# Patient Record
Sex: MALE | Race: WHITE | NOT HISPANIC OR LATINO | Employment: STUDENT | URBAN - METROPOLITAN AREA
[De-identification: names, ages, dates, MRNs, and addresses within clinical notes are randomized per-mention and may not be internally consistent; named-entity substitution may affect disease eponyms.]

---

## 2023-10-27 PROBLEM — D22.30 MELANOCYTIC NEVI OF UNSPECIFIED PART OF FACE: Status: ACTIVE | Noted: 2021-03-19

## 2023-10-27 PROBLEM — F90.9 ADHD (ATTENTION DEFICIT HYPERACTIVITY DISORDER): Status: ACTIVE | Noted: 2023-10-27

## 2023-10-27 PROBLEM — R51.9 HEADACHE: Status: ACTIVE | Noted: 2023-10-27

## 2023-10-27 PROBLEM — D69.6 THROMBOCYTOPENIA (CMS-HCC): Status: ACTIVE | Noted: 2023-10-27

## 2023-10-27 PROBLEM — D69.6 LOW PLATELET COUNT (CMS-HCC): Status: ACTIVE | Noted: 2023-10-27

## 2023-10-27 PROBLEM — R42 LIGHTHEADEDNESS: Status: ACTIVE | Noted: 2023-10-27

## 2023-10-27 PROBLEM — F95.2 TOURETTES SYNDROME: Status: ACTIVE | Noted: 2023-10-27

## 2023-10-27 PROBLEM — F33.2 SEVERE EPISODE OF RECURRENT MAJOR DEPRESSIVE DISORDER, WITHOUT PSYCHOTIC FEATURES (MULTI): Status: ACTIVE | Noted: 2023-10-27

## 2023-10-27 PROBLEM — T50.905A ADVERSE REACTION TO DRUG: Status: ACTIVE | Noted: 2023-10-27

## 2023-10-27 PROBLEM — R10.9 ABDOMINAL PAIN: Status: ACTIVE | Noted: 2023-10-27

## 2023-10-27 PROBLEM — R19.7 DIARRHEA: Status: ACTIVE | Noted: 2023-10-27

## 2023-10-27 PROBLEM — S06.0X0A CONCUSSION WITH NO LOSS OF CONSCIOUSNESS: Status: ACTIVE | Noted: 2023-10-27

## 2023-10-27 PROBLEM — S39.012A BACK STRAIN: Status: ACTIVE | Noted: 2023-10-27

## 2023-10-27 PROBLEM — Z86.59 HISTORY OF TICS: Status: ACTIVE | Noted: 2023-10-27

## 2023-10-27 PROBLEM — R53.83 FATIGUE: Status: ACTIVE | Noted: 2023-10-27

## 2023-10-27 PROBLEM — L25.9 CONTACT DERMATITIS: Status: ACTIVE | Noted: 2023-10-27

## 2023-10-27 PROBLEM — G47.00 INSOMNIA: Status: ACTIVE | Noted: 2023-10-27

## 2023-10-27 PROBLEM — D22.5 MELANOCYTIC NEVI OF TRUNK: Status: ACTIVE | Noted: 2021-03-19

## 2023-10-27 PROBLEM — E80.4 GILBERT'S SYNDROME: Status: ACTIVE | Noted: 2023-10-27

## 2023-10-27 PROBLEM — R63.6 LOW WEIGHT: Status: ACTIVE | Noted: 2023-10-27

## 2023-10-27 PROBLEM — L90.5 SCAR CONDITION AND FIBROSIS OF SKIN: Status: ACTIVE | Noted: 2021-03-19

## 2023-10-27 PROBLEM — R42 DIZZINESS: Status: ACTIVE | Noted: 2023-10-27

## 2023-10-27 PROBLEM — L81.4 OTHER MELANIN HYPERPIGMENTATION: Status: ACTIVE | Noted: 2021-03-19

## 2023-10-27 PROBLEM — G56.20 LESION OF ULNAR NERVE: Status: ACTIVE | Noted: 2023-10-27

## 2023-10-27 PROBLEM — F50.9 EATING DISORDER: Status: ACTIVE | Noted: 2023-10-27

## 2023-10-27 PROBLEM — E80.6 HYPERBILIRUBINEMIA: Status: ACTIVE | Noted: 2023-10-27

## 2023-10-27 RX ORDER — LORATADINE 10 MG/1
1 TABLET ORAL DAILY
COMMUNITY
Start: 2017-07-08

## 2023-10-27 RX ORDER — OMEPRAZOLE 20 MG/1
20 CAPSULE, DELAYED RELEASE ORAL DAILY
COMMUNITY
Start: 2023-02-10

## 2023-10-27 RX ORDER — IBUPROFEN 600 MG/1
600 TABLET ORAL 3 TIMES DAILY PRN
COMMUNITY
Start: 2021-02-03

## 2023-10-27 RX ORDER — TRIAMCINOLONE ACETONIDE 1 MG/G
CREAM TOPICAL 2 TIMES DAILY
COMMUNITY
Start: 2016-09-28

## 2023-10-27 RX ORDER — ONDANSETRON 4 MG/1
4 TABLET, ORALLY DISINTEGRATING ORAL EVERY 8 HOURS
COMMUNITY
Start: 2021-02-02

## 2023-10-30 ENCOUNTER — EVALUATION (OUTPATIENT)
Dept: OCCUPATIONAL THERAPY | Facility: CLINIC | Age: 18
End: 2023-10-30
Payer: COMMERCIAL

## 2023-10-30 DIAGNOSIS — M25.522 LEFT ELBOW PAIN: Primary | ICD-10-CM

## 2023-10-30 DIAGNOSIS — G56.22 LESION OF ULNAR NERVE, LEFT UPPER LIMB: ICD-10-CM

## 2023-10-30 PROCEDURE — 97165 OT EVAL LOW COMPLEX 30 MIN: CPT | Mod: GO | Performed by: OCCUPATIONAL THERAPIST

## 2023-10-30 NOTE — LETTER
November 1, 2023     Patient: Garrison Vallecillo   YOB: 2005   Date of Visit: 10/30/2023       To Whom it May Concern:    Garrison Vallecillo was seen in my clinic on 10/30/2023. He {Return to school/sport:42997}.    If you have any questions or concerns, please don't hesitate to call.         Sincerely,          Dixie Lopez, OT        CC: No Recipients

## 2023-10-30 NOTE — PROGRESS NOTES
Evaluation/Treatment    Patient Name: Garrison Vallecillo  MRN: [id number]  : 2005  Today's Date: 23    OT Evaluation (Low) Time Entry: 40  Time Calculation  Start Time: 0850  Stop Time: 0930  Time Calculation (min): 40 min      Subjective   Current Problem/Diagnosis:  1. Left elbow pain        2. 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 Patient underwent L Cubital Tunnel Release with Ulnar Nerve Transposition on 10/16/23.    Quality of life: good    Pain  Current pain ratin  At best pain ratin  At worst pain rating: 10  Location: L elbow  Quality: sharp  Relieving factors: change in position  Aggravating factors: movement  Progression: improved    Social Support  Lives in: multiple-level home  Lives with: Grandparents.    Hand dominance: right    Patient Goals  Patient goals for therapy: decreased edema, decreased pain, increased motion, increased strength, independence with ADLs/IADLs, return to sport/leisure activities and return to work         Precautions: 5# restriction        Objective     Prior Functional Status: Fully Independent     Work History: Prior to surgery working at [employer] 5 days/week x 13 hrs/day     Occupation and Activities:  Work status: off work  Job title/type of work:  Shipment; loading/unloading    Current functional limitations: Bathing, Dressing, Lifting, Holding, Crafts/hobbies, and Driving       Objective     Sensation: Denies abnormal sensation in LUE prior to sx; however, patient reports has returned to normal per patient; however, hypersensitivity noted throughout surgical site.     Appearance: 7 cm incision with  Steri-strips intact on arrival; ecchymosis throughout proximal FA.    Edema: Moderate edema in L elbow and proximal FA    Coordination: Impaired      Range of Motion/Strength:    MMT deferred due to pain     Upper Extremity ROM    AROM  Right  Left   ELBOW Flexion/Extension 150/0 130/-45     FOREARM Pronation 90 90    Supination 90 90     WRIST Flexion WFL WFL    Extension WFL WFL    Radial Deviation WFL WFL    Ulnar Deviation WFL WFL     Hand Range of Motion     All digits WFL for flexion/extension and abduction/adduction.      Hand Strength   (lbs) Right Left   1     2 N/a N/a   3     4     5       Pinch Right Left   2-pt N/a N/a   3-pt N/a N/a   Lateral N/a N/a       Outcome Measure: UEFI=76/80    Splinting: N/a    Modalities: Therapist educated patient on use of hot/cold modalities for pain management and pre/post-HEP.    Therapy/Activity: Therapist provided demonstration with verbal instruction for the following AROM ther ex:  Gross digital flexion/extension  Digital abduction/adduction  Opposition  Wrist flexion/extension  Wrist RD/UD  FA supination/pronation  Elbow flexion/extension  10 reps each  Written handout issued; HEP established       EDUCATION: Therapist educated patient on edema management strategies; issued patient Size E compression sleeve with replacements to be worn for edema control. Therapist educated patient on surgical precautions and functional implications. Educated patient on incision care--therapist thoroughly cleaned surgical site and removed Steri-strips at this time. Therapist educated patient on desensitization strategies/techniques to reduce hypersensitivity at surgical site. Therapist educated patient on importance of HEP and follow through to achieve maximum benefits of intervention.        Assessment & Plan     Assessment  Impairments: abnormal coordination, abnormal or restricted ROM, activity intolerance, impaired physical strength, lacks appropriate home exercise program  and pain with function  Assessment details: Patient is a 17-year-old male who presents with L elbow pain, edema, impaired ROM, weakness, and impaired functional use of LUE following surgical intervention. Skilled OT intervention indicated to address deficits and facilitate return to PLOF.    Low complexity evaluation selected due to patient's clinical presentation, medical stability, and condition uncomplicated by existing co-morbidities that may affect patient's rehab tolerance, progression, and potential.   Prognosis: good    Plan  Planned therapy interventions: home exercise program, functional ROM exercises, flexibility, compression, ADL retraining, manual therapy, neuromuscular re-education, motor coordination training, soft tissue mobilization, therapeutic activities, stretching and strengthening  Frequency: 1x week  Duration in visits: 8  Treatment plan discussed with: patient           Goals:  Active       OT Goals       1. Patient will increase L elbow flexion by at least 15 degrees to increase functional use of LUE.       Start:  10/31/23    Expected End:  11/13/23            2. Patient will increase L elbow extension to -5 to increase functional use of LUE.       Start:  10/31/23    Expected End:  11/13/23            3. Patient will increase overall functional independence AEB UEFI score of 80/80.       Start:  10/31/23    Expected End:  12/25/23            4. Patient will increase L hand  strength to within 65% of R to increase functional use of LUE.       Start:  10/31/23    Expected End:  12/25/23

## 2023-10-30 NOTE — LETTER
November 1, 2023     Patient: Garrison Vallecillo   YOB: 2005   Date of Visit: 10/30/2023       To Whom It May Concern:    It is my medical opinion that Garrison Vallecillo {Work release (duty restriction):59139}.    If you have any questions or concerns, please don't hesitate to call.         Sincerely,        Dixie Lopez, OT    CC: No Recipients

## 2023-10-31 PROBLEM — M25.522 LEFT ELBOW PAIN: Status: ACTIVE | Noted: 2023-10-31

## 2023-10-31 SDOH — ECONOMIC STABILITY: GENERAL: QUALITY OF LIFE: GOOD

## 2023-10-31 ASSESSMENT — ENCOUNTER SYMPTOMS
PAIN SCALE AT HIGHEST: 10
QUALITY: SHARP
EXACERBATED BY: MOVEMENT
PAIN SCALE: 7
PAIN SCALE AT LOWEST: 0
ALLEVIATING FACTORS: CHANGE IN POSITION
PAIN LOCATION: L ELBOW

## 2023-11-07 DIAGNOSIS — M25.522 LEFT ELBOW PAIN: Primary | ICD-10-CM

## 2023-11-15 ENCOUNTER — TREATMENT (OUTPATIENT)
Dept: OCCUPATIONAL THERAPY | Facility: CLINIC | Age: 18
End: 2023-11-15
Payer: COMMERCIAL

## 2023-11-15 DIAGNOSIS — M25.522 LEFT ELBOW PAIN: ICD-10-CM

## 2023-11-15 PROCEDURE — 97110 THERAPEUTIC EXERCISES: CPT | Mod: GO,CO

## 2023-11-15 NOTE — PROGRESS NOTES
"Occupational Therapy Treatment    Patient Name: Garrison Vallecillo  MRN: [id number]  Today's Date: 11/15/2023    Time Calculation  Start Time: 1036  Stop Time: 1120  Time Calculation (min): 44 min  OT Therapeutic Procedures Time Entry  Therapeutic Exercise Time Entry: 44    Insurance:  Visit number: 2 of 8  Authorization info: Authorized through 12/25/23  Insurance Type: [employer]    Subjective   L cubital tunnel release with ulnar nerve transposition on 10/16  Referred by: Mayur, follow up first week of December    Patient reports \"my arm is actually getting better\" . Reviewed OT goals and 5# precaution with pt, he verbalized understanding    Performing HEP?: Yes    Pain:   1-2/10, not using modalities. Educated in importance of use of heat and ice to achieve best outcome.     Objective    Physical Observation:trace edema noted in L elbow. Healed incision, scar hard in most areas. Writer performed and educated pt in scar massage, pt did not tolerate well. Noted hypersensitivity. Pt encouraged to follow through with de-sensitization and scar massage at home.   Sensory: numbness noted on bottom of forearm     measured at 115# on R UE and 60# on L UE  AROM measured at 140 degrees L elbow flexion(inc by 10) and -28 L elbow ext (inc 13)  Treatment:    Modalities:   Moist heat applied at start pf session to L elbow x 5 min during goal review to promote muscle movement during session.   Ice applied at end of session x 5 min to reduce edema and inflammation    Therapeutic Exercise:  PROM by writer of L elbow flexion and ext  AROM by pt of L elbow flexion and ext , FA supinaiton and pronation with focus on holding 5 sec at end range x 10 reps   Educated pt in HEP with yellow (low resistance) theraputty, pt completd 5 reps of gross grasp, individual pinch. Handout provided.   Pt completed 5 reps of Nerve glides with L elbow, handout provided for HEP follow through.     Post-tx pain:3/10    Assessment/Plan Pt has " increased AROM of L elbow in fleixon, ext and supination. Max fatigue by end of session. Pt to follow through with exisiting and added HEP along with use of modalities. Pt tolerated ROM well, needs to follow through with scar and de-sensitization.       OT Goals         1. Patient will increase L elbow flexion by at least 15 degrees to increase functional use of LUE.     PROGRESSING    Start:  10/31/23    Expected End:  11/13/23              2. Patient will increase L elbow extension to -5 to increase functional use of LUE.     PROGRESSING    Start:  10/31/23    Expected End:  11/13/23              3. Patient will increase overall functional independence AEB UEFI score of 80/80.     PROGRESSING    Start:  10/31/23    Expected End:  12/25/23              4. Patient will increase L hand  strength to within 65% of R to increase functional use of LUE.     PROGRESSING    Start:  10/31/23    Expected End:  12/25/23

## 2023-11-16 ENCOUNTER — TELEPHONE (OUTPATIENT)
Dept: PRIMARY CARE | Facility: CLINIC | Age: 18
End: 2023-11-16
Payer: COMMERCIAL

## 2023-11-16 DIAGNOSIS — R39.9 UTI SYMPTOMS: Primary | ICD-10-CM

## 2023-11-16 NOTE — TELEPHONE ENCOUNTER
spoke with patient this morning. He is having urinary frequency and strong odor. He does have some burning and his urine is red in color. Dr Mercado is not here to day. Advised to go to the urgent care to be seen today. Patient declined, stating he will wait until Dr Mercado can get him in.

## 2023-11-22 ENCOUNTER — APPOINTMENT (OUTPATIENT)
Dept: OCCUPATIONAL THERAPY | Facility: CLINIC | Age: 18
End: 2023-11-22
Payer: COMMERCIAL

## 2023-11-22 NOTE — PROGRESS NOTES
Occupational Therapy                 Therapy Communication Note    Patient Name: Garrison Vallecillo  MRN: [id number]  Today's Date: 11/22/2023     Discipline: Occupational Therapy    Missed Visit Reason:  Pt's mom called, pt is not feeling well.     Missed Time: Cancel

## 2023-11-29 ENCOUNTER — TREATMENT (OUTPATIENT)
Dept: OCCUPATIONAL THERAPY | Facility: CLINIC | Age: 18
End: 2023-11-29
Payer: COMMERCIAL

## 2023-11-29 DIAGNOSIS — M25.522 LEFT ELBOW PAIN: ICD-10-CM

## 2023-11-29 PROCEDURE — 97110 THERAPEUTIC EXERCISES: CPT | Mod: GO,CO

## 2023-11-29 NOTE — PROGRESS NOTES
"Occupational Therapy Treatment    Patient Name: Garrison Vallecillo  MRN: [id number]  Today's Date: 11/29/2023    Time Calculation  Start Time: 1035  Stop Time: 1119  Time Calculation (min): 44 min  OT Therapeutic Procedures Time Entry  Therapeutic Exercise Time Entry: 44  Insurance:  Visit number: 3 of 8  Authorization info: Authorized through 12/25/23  Insurance Type: [employer]    Subjective   L cubital tunnel release with ulnar nerve transposition on 10/16  Referred by: Mayur, follow up first week of December    Patient reports \"my arm is actually getting better, but sometimes it feels weird\" .   Reviewed OT goals and 5# precaution with pt, he verbalized understanding. Pt stated he is using heat 2 times daily.     Performing HEP?: Yes    Pain:   1/10' tight\".   Objective    Physical Observation:trace edema noted in L elbow. Healed incision, scar hard in most areas. Noted two areas, along top and bottom of scar where internal stitches are possibly coming through. No drainage or redness. OTR assessed. Pt advised to leave along and consult with doctor on follow up. Writer performed and educated pt in scar massage, pt did not tolerate well. Noted hypersensitivity. Pt highly encouraged to follow through with de-sensitization and scar massage at home.   Sensory: hypersensitivity noted along elbow and scar area.     AROM measured at 140 degrees L elbow flexion(inc by 10) and -28 L elbow ext (inc 13)  Treatment:    Modalities:   Ice applied at end of session x 5 min to reduce edema and inflammation    Therapeutic Exercise:  Seated arm bike at no resistance for 3 min forward and 3 min reverse. Good tolerance with occasional cues to not compensate  Initiated pulleys for  flexion/ext of elbow and shoulder.  15 reps with 5 sec hold at end range.   Initiated 1-2# resistance PRE's , completed 1-2 sets of 10 reps each for bcep curls with palm up, palm down and palm neutral. Provided with written HEP.     AROM measured on this " date as follows:   140 elbow flexion(increased 10)  -5 elbow extension(Better by 23 degrees)      Post-tx pain:3/10    Assessment/Plan Pt has increased AROM of L elbow in flexion, ext and supination. Min /mod  fatigue (down from max ) by end of session. Pt to follow through with exisiting and added HEP along with use of modalities. Pt tolerated ROM well, needs to follow through with scar massage, wear of nu gel and de-sensitization.       OT Goals         1. Patient will increase L elbow flexion by at least 15 degrees to increase functional use of LUE.     PROGRESSING    Start:  10/31/23    Expected End:  11/13/23              2. Patient will increase L elbow extension to -5 to increase functional use of LUE.     PROGRESSING    Start:  10/31/23    Expected End:  11/13/23              3. Patient will increase overall functional independence AEB UEFI score of 80/80.     PROGRESSING    Start:  10/31/23    Expected End:  12/25/23              4. Patient will increase L hand  strength to within 65% of R to increase functional use of LUE.     PROGRESSING    Start:  10/31/23    Expected End:  12/25/23

## 2023-12-06 ENCOUNTER — DOCUMENTATION (OUTPATIENT)
Dept: OCCUPATIONAL THERAPY | Facility: CLINIC | Age: 18
End: 2023-12-06
Payer: COMMERCIAL

## 2023-12-06 NOTE — PROGRESS NOTES
Occupational Therapy                 Therapy Communication Note    Patient Name: Garrison Vallecillo  MRN: [id number]  Today's Date: 12/6/2023     Discipline: Occupational Therapy    Missed Visit Reason:  Patient failed to call/show for scheduled appointment.    Missed Time: No Show    Comment:

## 2023-12-11 ENCOUNTER — CLINICAL SUPPORT (OUTPATIENT)
Dept: OCCUPATIONAL THERAPY | Facility: CLINIC | Age: 18
End: 2023-12-11
Payer: COMMERCIAL

## 2023-12-11 DIAGNOSIS — M25.522 LEFT ELBOW PAIN: ICD-10-CM

## 2023-12-11 PROCEDURE — 97110 THERAPEUTIC EXERCISES: CPT | Mod: GO,CO

## 2023-12-11 ASSESSMENT — PAIN SCALES - GENERAL: PAINLEVEL_OUTOF10: 1

## 2023-12-11 ASSESSMENT — PAIN DESCRIPTION - DESCRIPTORS: DESCRIPTORS: TIGHTNESS

## 2023-12-11 ASSESSMENT — PAIN - FUNCTIONAL ASSESSMENT: PAIN_FUNCTIONAL_ASSESSMENT: 0-10

## 2023-12-11 NOTE — PROGRESS NOTES
"Time Calculation  Start Time: 0848  Stop Time: 0938  Time Calculation (min): 50 min  OT Therapeutic Procedures Time Entry  Therapeutic Exercise Time Entry: 50Occupational Therapy Treatment    Patient Name: Garrison Vallecillo  MRN: [id number]  Today's Date: 12/11/2023    Time Calculation  Start Time: 0848  Stop Time: 0938  Time Calculation (min): 50 min  OT Therapeutic Procedures Time Entry  Therapeutic Exercise Time Entry: 50  Insurance:  Visit number: 4 of 8  Authorization info: Authorized through 12/25/23  Insurance Type: [employer]    Subjective   L cubital tunnel release with ulnar nerve transposition on 10/16  Referred by: Mayur, had follow up last week, doctor cleared pt for lifting within pt tolerance. Next follow up mid January.     Patient reports \" My arm just doesn't feel normal when I move it. I am kind of hesitant to bend it because of that. I did try to lift something heavy and my arm just got tired real quick\". Writer discussed healing process and importance of functional activity , HEP and ROM to prevent deficits. Pt using heat up to twice daily.   Reviewed goals with pt, he verbalized understanding.   Performing HEP?: Yes    Pain:  Pain Assessment  Pain Assessment: 0-10  Pain Score: 1  Pain Type: Acute pain, Surgical pain  Pain Location: Elbow  Pain Orientation: Left  Pain Descriptors: Tightness1/10' tight\".   Objective    Physical Observation:trace edema noted in L elbow. Healed incision, scar hard only  at proximal end. Hypersensitivity noted there. Writer performed and educated pt in scar massage, pt did not tolerate well. Noted hypersensitivity. Pt completing scar massage a couple times a day and using heat 2 times a day. Noted numbness in base of forearm near elbow. Educated in continued re-sensitization.   UEFI completed by pt at 65/80 (was 76/80 at eval).  AROM measured at 145 degrees L elbow flexion(inc by 15) and -3 L elbow ext (inc 25)  Grasp measured at 115# R UE(same) and and 115# (inc " by 55#)  Treatment:    Modalities:   Moist heat applied to L elbow , bicep and forearm x 5 min to promote muscle movement during session.   Ice applied at end of session to reduce edema and inflammation x 5 min  Therapeutic Exercise:  Standing  arm bike at level 2.5(up from 1.0) resistance for 3 min forward and 3 min reverse. Initiated L arm only at level 1 for 1 min forward and 1 min in reverse.  Good tolerance with decreased cues from last session to not compensate  Provided pt with orange resistance(low/med)theraputty for upgrade to existing HEP  Initiated 3# (up from 2#)  resistance PRE's , completed 1 sets of 10 reps each for bcep curls with palm up, palm down and palm neutral. Provided with written HEP.     Post-tx pain:0/10    Assessment/Plan Pt has increased AROM of L elbow in flexion, ext and supination. Noted increase in grasp of L UE.  Min  fatigue (down from min/mod ) by end of session. Pt to follow through with 3# PRE's, along with use of modalities. Pt to follow through with scar massage, wear of nu gel and de-sensitization. Noted decrease in UEFI, pt stated that he didn't quite understand the questions the first time he completed it. Pt motivated to return to function.   Education  Individual(s) Educated: Patient  Education Provided: POC discussed and agreed upon  Home Program: Strengthening, AROM  Equipment:  (3# free weights)  Patient/Caregiver Demonstrated Understanding: yes  Plan of Care Discussed and Agreed Upon: yes  Patient Response to Education: Patient/Caregiver Verbalized Understanding of Information, Patient/Caregiver Performed Return Demonstration of Exercises/Activities, Patient/Caregiver Asked Appropriate Questions   OT Goals         1. Patient will increase L elbow flexion by at least 15 degrees to increase functional use of LUE.     MET    Start:  10/31/23    Expected End:  11/13/23              2. Patient will increase L elbow extension to -5 to increase functional use of LUE.      MET    Start:  10/31/23    Expected End:  11/13/23              3. Patient will increase overall functional independence AEB UEFI score of 80/80.     PROGRESSING    Start:  10/31/23    Expected End:  12/25/23              4. Patient will increase L hand  strength to within 65% of R to increase functional use of LUE.     MET    Start:  10/31/23    Expected End:  12/25/23

## 2023-12-13 ENCOUNTER — APPOINTMENT (OUTPATIENT)
Dept: OCCUPATIONAL THERAPY | Facility: CLINIC | Age: 18
End: 2023-12-13
Payer: COMMERCIAL

## 2023-12-13 NOTE — PROGRESS NOTES
Occupational Therapy                 Therapy Communication Note    Patient Name: Garrison Vallecillo  MRN: [id number]  Today's Date: 12/13/2023     Discipline: Occupational Therapy    Missed Visit Reason:  Pt cancelled through My chart arg[facility]    Missed Time: Cancel

## 2023-12-20 ENCOUNTER — TREATMENT (OUTPATIENT)
Dept: OCCUPATIONAL THERAPY | Facility: CLINIC | Age: 18
End: 2023-12-20
Payer: COMMERCIAL

## 2023-12-20 DIAGNOSIS — M25.522 LEFT ELBOW PAIN: ICD-10-CM

## 2023-12-20 PROCEDURE — 97110 THERAPEUTIC EXERCISES: CPT | Mod: GO,CO

## 2023-12-20 ASSESSMENT — PAIN - FUNCTIONAL ASSESSMENT: PAIN_FUNCTIONAL_ASSESSMENT: 0-10

## 2023-12-20 ASSESSMENT — PAIN DESCRIPTION - DESCRIPTORS: DESCRIPTORS: SORE;TIGHTNESS

## 2023-12-20 ASSESSMENT — PAIN SCALES - GENERAL: PAINLEVEL_OUTOF10: 1

## 2023-12-20 NOTE — PROGRESS NOTES
"Occupational Therapy treatment    Time Calculation  Start Time: 0938  Stop Time: 1018  Time Calculation (min): 40 min  OT Therapeutic Procedures Time Entry  Therapeutic Exercise Time Entry: 40    Patient Name: Garrison Vallecillo  MRN: [id number]  Today's Date: 12/20/2023    Time Calculation  Start Time: 0938  Stop Time: 1018  Time Calculation (min): 40 min  OT Therapeutic Procedures Time Entry  Therapeutic Exercise Time Entry: 40  Insurance:  Visit number: 5 of 8  Authorization info: Authorized through 12/25/23  Insurance Type: [employer]    Subjective   L cubital tunnel release with ulnar nerve transposition on 10/16  Referred by: Mayur, had follow up last week, doctor cleared pt for lifting within pt tolerance. Next follow up mid January.     Patient reports \" My arm just doesn't feel normal when I move it. I am kind of hesitant to bend it because of that. I did try to lift something heavy and my arm just got tired real quick\". Writer discussed healing process and importance of functional activity , HEP and ROM to prevent deficits. Pt using heat up to twice daily.   Reviewed goals with pt, he verbalized understanding.   Performing HEP?: Yes    Pain:  Pain Assessment  Pain Assessment: 0-10  Pain Score: 1  Pain Type: Acute pain, Surgical pain  Pain Location: Elbow  Pain Orientation: Left  Pain Descriptors: Sore, Tightness1/10' I can tell it is tight\". States it is only with exercise. Using heat 2 times a day.   Objective    Physical Observation:trace edema noted in L elbow. Healed incision, scar softening.  Hypersensitivity better. Pt is competing scar massage and using heat 2 times a day. Noted numbness in base of forearm near elbow.   AROM measured, unchanged since last measure.     Modalities:   Moist heat applied to L elbow , bicep and forearm x 5 min to promote muscle movement during session.   Ice applied at end of session to reduce edema and inflammation x 5 min    Therapeutic Exercise:  Standing  arm bike at " "level 4.0(up from 3.0) resistance for 3 min forward and 3 min reverse. L arm only at level 2(up from 1)  for 1 min forward and 1 min in reverse.  Good tolerance   Initiated  theraband HEP , 1 set of 10 reps for biceps with palm up and palm down and neutral , also tricep extension with orange (low/medium resistance ) theraband. Provided with written HEP for follow through.     Post-tx pain:1/10, \"a little sore\".     Assessment:  No fatigue . Pt to follow through with added theraband HEP, 3# PRE's increasing reps, along with use of modalities. Pt to follow through with scar massage, wear of nu gel and de-sensitization.  Pt motivated to return to function.   Education  Individual(s) Educated: Patient  Education Provided: POC discussed and agreed upon  Home Program: Strengthening, AROM  Equipment:  (orange theraband)  Patient/Caregiver Demonstrated Understanding: yes  Plan of Care Discussed and Agreed Upon: yes  Patient Response to Education: Patient/Caregiver Verbalized Understanding of Information, Patient/Caregiver Performed Return Demonstration of Exercises/Activities, Patient/Caregiver Asked Appropriate Questions   OT Goals         1. Patient will increase L elbow flexion by at least 15 degrees to increase functional use of LUE.     MET    Start:  10/31/23    Expected End:  11/13/23              2. Patient will increase L elbow extension to -5 to increase functional use of LUE.     MET    Start:  10/31/23    Expected End:  11/13/23              3. Patient will increase overall functional independence AEB UEFI score of 80/80.     PROGRESSING    Start:  10/31/23    Expected End:  12/25/23              4. Patient will increase L hand  strength to within 65% of R to increase functional use of LUE.     MET    Start:  10/31/23    Expected End:  12/25/23           "

## 2024-02-23 ENCOUNTER — DOCUMENTATION (OUTPATIENT)
Dept: OCCUPATIONAL THERAPY | Facility: CLINIC | Age: 19
End: 2024-02-23
Payer: COMMERCIAL

## 2024-02-23 NOTE — PROGRESS NOTES
Occupational Therapy Discharge [facility]    Patient Name: Garrison Vallecillo  MRN: [id number]  Today's Date: 2/23/2024    Subjective   Current Problem:  L cubital tunnel release with ulnar nerve transposition on 10/16/23.    Pain: N/a       Objective   Interventions: Ther ex, ther act, manual, neuromuscular re-education, ADL retraining.     ADL Assessment:  UEFI at evalation= 76/80  UEFI as of 12/20/23= 65/80     Extremity Assessments:    AROM:  L elbow flexion: 145 (+15) and -3   L elbow extension: -3 (inc 25)       (lbs) Right Left   1     2 115# 115# (+55)   3     4     5            Assessment/Plan Patient inconsistently attended OT tx sx's throughout POC; progress seen in AROM and  strength at time of last tx sx.          Goals:  Active       OT Goals       1. Patient will increase L elbow flexion by at least 15 degrees to increase functional use of LUE. (Met)       Start:  10/31/23    Expected End:  11/13/23    Resolved:  11/29/23         2. Patient will increase L elbow extension to -5 to increase functional use of LUE. (Met)       Start:  10/31/23    Expected End:  11/13/23    Resolved:  11/29/23         3. Patient will increase overall functional independence AEB UEFI score of 80/80. (Progressing)       Start:  10/31/23    Expected End:  12/25/23            4. Patient will increase L hand  strength to within 65% of R to increase functional use of LUE. (Progressing)       Start:  10/31/23    Expected End:  12/25/23

## 2024-08-09 ENCOUNTER — HOSPITAL ENCOUNTER (EMERGENCY)
Facility: HOSPITAL | Age: 19
Discharge: HOME | End: 2024-08-09
Payer: COMMERCIAL

## 2024-08-09 ENCOUNTER — APPOINTMENT (OUTPATIENT)
Dept: RADIOLOGY | Facility: HOSPITAL | Age: 19
End: 2024-08-09
Payer: COMMERCIAL

## 2024-08-09 VITALS
SYSTOLIC BLOOD PRESSURE: 120 MMHG | TEMPERATURE: 98.5 F | DIASTOLIC BLOOD PRESSURE: 67 MMHG | HEIGHT: 70 IN | RESPIRATION RATE: 16 BRPM | WEIGHT: 130 LBS | BODY MASS INDEX: 18.61 KG/M2 | HEART RATE: 65 BPM | OXYGEN SATURATION: 99 %

## 2024-08-09 DIAGNOSIS — S89.91XA INJURY OF RIGHT KNEE, INITIAL ENCOUNTER: Primary | ICD-10-CM

## 2024-08-09 PROCEDURE — 73564 X-RAY EXAM KNEE 4 OR MORE: CPT | Mod: RIGHT SIDE | Performed by: RADIOLOGY

## 2024-08-09 PROCEDURE — 99283 EMERGENCY DEPT VISIT LOW MDM: CPT

## 2024-08-09 PROCEDURE — 73564 X-RAY EXAM KNEE 4 OR MORE: CPT | Mod: RT

## 2024-08-09 ASSESSMENT — PAIN - FUNCTIONAL ASSESSMENT: PAIN_FUNCTIONAL_ASSESSMENT: 0-10

## 2024-08-09 ASSESSMENT — COLUMBIA-SUICIDE SEVERITY RATING SCALE - C-SSRS
1. IN THE PAST MONTH, HAVE YOU WISHED YOU WERE DEAD OR WISHED YOU COULD GO TO SLEEP AND NOT WAKE UP?: NO
2. HAVE YOU ACTUALLY HAD ANY THOUGHTS OF KILLING YOURSELF?: NO
6. HAVE YOU EVER DONE ANYTHING, STARTED TO DO ANYTHING, OR PREPARED TO DO ANYTHING TO END YOUR LIFE?: NO

## 2024-08-09 ASSESSMENT — PAIN DESCRIPTION - LOCATION: LOCATION: KNEE

## 2024-08-09 ASSESSMENT — PAIN SCALES - GENERAL: PAINLEVEL_OUTOF10: 1

## 2024-08-10 NOTE — ED PROVIDER NOTES
HPI   Chief Complaint   Patient presents with    Knee Pain     Sitting on the bed obed crossed and heard something pop       Patient is a 18-year-old male presenting to the ED with cc of right knee pain times today.  Patient was sitting in his chair and crosses leg when he heard and felt a pop.  Patient states he is unable to bear weight since.  Patient denies any other injuries to the area denies any history of dislocations in the past.  Denies any numbness or tingling.  Patient states his whole leg hurts.  Patient denies any other symptoms.  Patient states he feels disconnected and is trying to tell his brain to strengthen leg but cannot.  Patient had marijuana prior to calling ambulance.              Patient History   Past Medical History:   Diagnosis Date    Allergic contact dermatitis due to plants, except food 09/28/2016    Contact dermatitis due to poison ivy    Anxiety disorder, unspecified 01/05/2015    Anxiety    Contact with and (suspected) exposure to other bacterial communicable diseases 04/22/2018    Exposure to strep throat    Contact with and (suspected) exposure to other viral communicable diseases 04/22/2018    Exposure to influenza    Nasal congestion 11/23/2020    Nasal congestion with rhinorrhea    Other conditions influencing health status 11/23/2020    History of cough    Pain in thoracic spine 03/22/2017    Acute left-sided thoracic back pain     Past Surgical History:   Procedure Laterality Date    OTHER SURGICAL HISTORY  01/05/2015    Surgery Spermatic Cord Excision Of Hydrocele     Family History   Problem Relation Name Age of Onset    Anxiety disorder Mother      Depression Mother      Migraines Father       Social History     Tobacco Use    Smoking status: Never    Smokeless tobacco: Never   Substance Use Topics    Alcohol use: Not on file    Drug use: Yes     Types: Marijuana       Physical Exam   ED Triage Vitals [08/09/24 1832]   Temperature Heart Rate Respirations BP   36.9 °C (98.5  °F) 83 16 125/73      Pulse Ox Temp Source Heart Rate Source Patient Position   98 % Oral Monitor Sitting      BP Location FiO2 (%)     Right arm --       Physical Exam  HENT:      Head: Normocephalic.   Cardiovascular:      Pulses: Normal pulses.   Pulmonary:      Effort: Pulmonary effort is normal.   Musculoskeletal:         General: Tenderness (Pain on palpation to the right knee.) present. No deformity.      Comments: Unable to perform flexion and extension of the right knee   Neurological:      General: No focal deficit present.      Mental Status: He is alert. Mental status is at baseline.           ED Course & MDM   Diagnoses as of 08/09/24 2039   Injury of right knee, initial encounter                 No data recorded                                 Medical Decision Making  Medical Decision Making:  Patient presented as described in HPI. Patient case including ROS, PE, and treatment and plan discussed with ED attending if attached as cosigner. Due to patients presentation orders completed include as documented.  Patient presents to the ED with cc of right knee pain.  Patient states he was crossing his leg and felt a pop unable to bear weight afterwards.  Patient denies any history of dislocations in the past.  Patient is nontoxic-appearing, pain on palpation to the right knee and pain at attempt of extension or flexion.  Patient has good pulses good capillary refill.  Patient is denying any pain medication here.  X-ray is negative.  On reassessment patient endorses improvement in symptoms and able to straighten and flex the leg.  Patient is able to bear weight but does have pain still with bearing weight.  When patient first attempted to bear weight he had some instability of the right knee and not sure if this is a ligamentous injury.  Patient will be placed in knee immobilizer and given crutches educated on close follow-up with Ortho.  Given referral here.  Educated on any worsening symptoms to return  educated on close follow-up.  Educated on RICE therapy.  Patient was advised to follow up with PCP or recommended provider in 2-3 days for another evaluation and exam. I advised patient/guardian to return or go to closest emergency room immediately if symptoms change, get worse, new symptoms develop prior to follow up. If there is no improvement in symptoms in the next 24 hours they are advised to return for further evaluation and exam. I also explained the plan and treatment course. Patient/guardian is in agreement with plan, treatment course, and follow up and states verbally that they will comply.      Patient care discussed with: N/A  Social Determinants affecting care: N/A    Final diagnosis and disposition as below.  See CI    Homegoing. I discussed the differential; results and discharge plan with the patient and/or family/friend/caregiver if present.  I emphasized the importance of follow-up with the physician I referred them to in the timeframe recommended.  I explained reasons for the patient to return to the Emergency Department. They agreed that if they feel their condition is worsening or if they have any other concern they should call 911 immediately for further assistance. I gave the patient an opportunity to ask all questions they had and answered all of them accordingly. They understand return precautions and discharge instructions. The patient and/or family/friend/caregiver expressed understanding verbally and that they would comply.       Disposition:  Discharge      This note has been transcribed using voice recognition and may contain grammatical errors, misplaced words, incorrect words, incorrect phrases or other errors.        XR knee right 4+ views   Final Result   1. Unremarkable radiographic evaluation of the right knee.        MACRO:   None        Signed by: Perla Daily 8/9/2024 7:18 PM   Dictation workstation:   [id number]           Procedure  Procedures     Michelle Jara  HERB  08/09/24 2045

## 2024-08-15 ENCOUNTER — OFFICE VISIT (OUTPATIENT)
Dept: ORTHOPEDIC SURGERY | Facility: CLINIC | Age: 19
End: 2024-08-15
Payer: COMMERCIAL

## 2024-08-15 DIAGNOSIS — M76.51 PATELLAR TENDINITIS OF RIGHT KNEE: ICD-10-CM

## 2024-08-15 DIAGNOSIS — S89.91XA RIGHT KNEE INJURY, INITIAL ENCOUNTER: Primary | ICD-10-CM

## 2024-08-15 PROCEDURE — 99203 OFFICE O/P NEW LOW 30 MIN: CPT

## 2024-08-15 PROCEDURE — 99213 OFFICE O/P EST LOW 20 MIN: CPT

## 2024-08-15 PROCEDURE — 1036F TOBACCO NON-USER: CPT

## 2024-08-15 NOTE — PROGRESS NOTES
HPI  Garrison Vallecillo is a 18 y.o. male, accompanied by his mother,  in office today for   Chief Complaint   Patient presents with    Right Knee - Injury   .  he states that last week he was sitting with his knee crossed and felt his knee cap catch so he aggressively straightened out the knee and in the process he kicked the wall.  Had pain anterior knee, went to the ED, placed into immobilizer brace which he stopped wearing and given crutches which are not present.  He states the pain has improved some from last week.  It feels better in the morning, but will have pain a few hours after waking and worse as the day progresses.  No pain with flexing and extending, but pain with medial/lateral movement. He has been resting and elevating, no pain medication    Past Medical History: Gilbert's syndrome    Medication  Current Outpatient Medications on File Prior to Visit   Medication Sig Dispense Refill    ibuprofen (IBU) 600 mg tablet Take 1 tablet (600 mg) by mouth 3 times a day as needed (mild pain).      loratadine (Claritin) 10 mg tablet Take 1 tablet (10 mg) by mouth once daily.      omeprazole (PriLOSEC) 20 mg DR capsule Take 1 capsule (20 mg) by mouth once daily.      ondansetron ODT (Zofran-ODT) 4 mg disintegrating tablet Take 1 tablet (4 mg) by mouth every 8 hours.      triamcinolone (Kenalog) 0.1 % cream Apply topically twice a day.       No current facility-administered medications on file prior to visit.       Physical Exam  Constitutional: well developed, well nourished male in no acute distress  Psychiatric: normal mood, appropriate affect  Eyes: sclera anicteric  HENT: normocephalic/atraumatic  CV: regular rate and rhythm   Respiratory: non labored breathing  Integumentary: no rash  Neurological: moves all extremities    Right Knee Exam     Tenderness   The patient is experiencing tenderness in the patella and patellar tendon.    Range of Motion   The patient has normal right knee ROM.    Tests   Pasha:   Medial - negative Lateral - negative  Varus: negative Valgus: negative  Drawer:  Anterior - negative    Posterior - negative  Patellar apprehension: positive    Other   Erythema: absent  Scars: absent  Sensation: normal  Swelling: none  Effusion: no effusion present              Imaging/Lab:  X-rays were taken 8/9/24 which were reviewed by myself and read by radiology and show No acute fracture or malalignment. Joint spaces are well maintained. No significant knee joint effusion. Soft tissues are unremarkable.      Assessment  Assessment: right knee injury, right knee pain with concern for patella tendonitis    Plan  Plan:  History, physical exam, and imaging were reviewed with patient. Patient to continue with RICE.  If he has pain with ambulating, should consider using the crutches to ambulate.  Orders given for PT for strengthening and modalities.  Follow Up: as needed if pain persists over next 4-6 weeks.    All questions were answered for the patient prior to end of exam and patient addressed their understanding.    Lynsey Campbell PA-C  08/15/24

## 2024-08-26 ENCOUNTER — EVALUATION (OUTPATIENT)
Dept: PHYSICAL THERAPY | Facility: CLINIC | Age: 19
End: 2024-08-26
Payer: COMMERCIAL

## 2024-08-26 DIAGNOSIS — M76.51 PATELLAR TENDINITIS OF RIGHT KNEE: Primary | ICD-10-CM

## 2024-08-26 DIAGNOSIS — S89.91XA RIGHT KNEE INJURY, INITIAL ENCOUNTER: ICD-10-CM

## 2024-08-26 PROCEDURE — 97162 PT EVAL MOD COMPLEX 30 MIN: CPT | Mod: GP

## 2024-08-26 PROCEDURE — 97110 THERAPEUTIC EXERCISES: CPT | Mod: GP

## 2024-08-26 NOTE — PROGRESS NOTES
"Physical Therapy Examination and Treatment Note    Patient Name: Garrison Vallecillo  MRN Number: [id number]  Evaluating Clinician: EWA Colmenares PT  Today's Date: 8/26/2024  Time Calculation  Start Time: 0100  Stop Time: 0137  Time Calculation (min): 37 min    INSURANCE:  Visit Number: 1  Approved Visits: ?  Insurance Info: EVAL ONLY - [employer] / AUTH REQ / 100% COVERAGE / [employer] [id number] / ds 8/23/24.     PRECAUTIONS/SPECIAL CONCERNS/RELEVANT PMHX: Lake Syndrome, anxiety disorder, ADHD,     PT CLINICAL PROBLEM: Right knee pain Probable    Chief Medical Dx code: M76.51 (ICD-10-CM) - Patellar tendinitis of right knee S89.91XA (ICD-10-CM) - Right knee injury, initial encounter   1. Patellar tendinitis of right knee  Referral to Physical Therapy    Follow Up In Physical Therapy      2. Right knee injury, initial encounter  Referral to Physical Therapy          SUBJECTIVE: 8/9/24 August was sitting with his knee crossed and felt his knee cap catch and he then straightened his knee suddenly and felt some pain. Went to ER placed in immobilizer and provided crutches. Later consulted ortho Lynsey Campbell PAC and referred to PT. At onset states his knee was swollen but denies any bruising. Has not used crutches at all. States he also declined pain meds. He is improving and feels much better at this time and feels 90% improved his main concern is \"not having this happen again\" He states sometimes he has felt like his knee cap is going to slip out. Denies past history of patellar dislocation or traumatic knee injury.       TREATMENT:  Symptoms/NPRS before Rx: 0  Symptoms/NPRS after Rx: 0    Timed Codes: (# minutes per modality and 0 if omitted)  Ther-Ex 02725:   Manual 05891:  NMRe-ed 17657:  Gait 70471:  Stim 48215:  Traction 72586:  Self Man: [id number]:    EDU patellar hypermobility and subluxation and knee cap less stable at terminal extension and more stable in flexion > 40 degrees needs to restore knee motion and " strength and avoid sudden extension with valgus motion to manage and other options patella sleeve  QS 10x  SAQ 10x      OBJECTIVE:  STEADI Fall Risk: 0 (score of 4+ indicates fall risk)   OUTCOME TOOL: LEFS 22/80  Rom ext -12 actively and full passively  Flex 120 limited by apprehension  Patella mobility restraint good  No swelling   No deformity  Non antalgic gait  Quad set 25%  Quad 4-  Hams 4/5  Not able to SLR cannot hold knee in extension      ASSESSMENT: Most likely right patellar subluxation episode and presents today guarded and concerned that this may happen again. Lacks knee flexion by 25 degrees due to discomfort and 5-10 degrees of active knee extension. Central patella. Min crepitation. Has passive restraint to lateral glide. No swelling. Some atrophy. Needs rehab for full recovery. Improved understanding that valgus motion with knee in last 30 degrees of extension is most risky position for subluxation and more aware of knee mechanics     Patient presents with low tissue reactivity,  low condition irritability, and high subject reactivity with impairments noted below and decreased level of functional abilities and would benefit from skilled PT to address limitations and achieve goals noted below.     PLAN: PT 1/week for right patellar stability and knee program to restore extension and flexion rom quad strength knee control and function     Patient/parent/caregiver agreed and consented to plan of care for skilled physical therapy at 1 times per week for 6-12 weeks. Physical Therapy to include modalities prn as indicated, therapeutic exercise, manual therapy, neuromuscular re-education, gait and functional performance training, instruction and practice in a home exercise program (HEP) and self-management skills.     CARE PLAN/GOALS: (met, progressing, not progressing)    STG's: (      weeks /      visits )  1  2    LTG's  (      weeks /      visits )  1  2  3  4  5 The Global Rating of Change Score  (GROC) will improve to 5/7 =  “a good deal better” or more.   6 Improve functional outcome tool score (FOTS: GIL, NDI, ASES, ABC, etc.) by > 10 points for Minimally Important Clinical Difference (MICD).  7 Patient/client will be independent with a HEP and self-management skills to further enhance recovery and progress.  8 Patient/client will verbalize >75% symptom reduction for frequency and severity of symptoms and/or > two point reduction of VAS/NPRS.  9 The Patient Specific Functional Scale metric (PSFS) will improve from baseline value to greater than 80% functional.

## 2024-09-09 ENCOUNTER — APPOINTMENT (OUTPATIENT)
Dept: PHYSICAL THERAPY | Facility: CLINIC | Age: 19
End: 2024-09-09
Payer: COMMERCIAL

## 2024-09-11 ENCOUNTER — TREATMENT (OUTPATIENT)
Dept: PHYSICAL THERAPY | Facility: CLINIC | Age: 19
End: 2024-09-11
Payer: COMMERCIAL

## 2024-09-11 DIAGNOSIS — M76.51 PATELLAR TENDINITIS OF RIGHT KNEE: ICD-10-CM

## 2024-09-11 PROCEDURE — 97110 THERAPEUTIC EXERCISES: CPT | Mod: GP,CQ

## 2024-09-17 ENCOUNTER — TREATMENT (OUTPATIENT)
Dept: PHYSICAL THERAPY | Facility: CLINIC | Age: 19
End: 2024-09-17
Payer: COMMERCIAL

## 2024-09-17 DIAGNOSIS — M76.51 PATELLAR TENDINITIS OF RIGHT KNEE: ICD-10-CM

## 2024-09-17 PROCEDURE — 97110 THERAPEUTIC EXERCISES: CPT | Mod: GP

## 2024-09-17 NOTE — PROGRESS NOTES
"Physical Therapy Treatment Note    Patient Name: Garrison Vallecillo  MRN Number: [id number]  Evaluating Clinician: EWA Colmenares PT  Today's Date: 9/17/2024       INSURANCE:  Visit Number: 3  Approved Visits: 16  Insurance Info: Auth Rcvd 15 visits 8/29-12/31 CPTs [id number] EVAL ONLY - [employer] / AUTH REQ / 100% COVERAGE / [employer] [id number] / ds 8/23/24.     PRECAUTIONS/SPECIAL CONCERNS/RELEVANT PMHX: [place] Syndrome, anxiety disorder, ADHD,     PT CLINICAL PROBLEM: Right knee pain probable patellar subluxation    Chief Medical Dx code: M76.51 (ICD-10-CM) - Patellar tendinitis of right knee S89.91XA (ICD-10-CM) - Right knee injury, initial encounter     SUBJECTIVE:  8/9/24 August was sitting with his knee crossed and felt his knee cap catch and he then straightened his knee suddenly and felt some pain. Went to ER placed in immobilizer and provided crutches. Later consulted ortho Lynsey Campbell PAC and referred to PT. At onset states his knee was swollen but denies any bruising. Has not used crutches at all. States he also declined pain meds. He is improving and feels much better at this time and feels 90% improved his main concern is \"not having this happen again\" He states sometimes he has felt like his knee cap is going to slip out. Denies past history of patellar dislocation or traumatic knee injury.     9-11-24  R knee feels like its \"popping\" but it doesn't hurt    9/17/24 states his right knee has been feeling better some discomfort but was able to hike about 1 mile on a trail.    TREATMENT:  Symptoms/NPRS before Rx: 0  Symptoms/NPRS after Rx: 0  Timed Codes: (# minutes per modality and 0 if omitted)  Ther-Ex 52685:  44  Manual 34813:  NMRe-ed 20828:  Gait 32285:  Stim 94426:  Traction 35067:  Self Man: 13590:    9/17/24  Bike 7 min R4   QS 10x  SAQ 10x  SLR 2 x 10  Bridge 2 x 10  EDU shallow femoral sulcus and higher risks for patellar subluxation in last 30 degrees knee " extension shown anatomic models to facilitate knowledge, also advised to be more aware of internal rotation with knee in extension re: patellar stability, pt also is habitually favoring the RLE on sit to stand and mini wall squats  Sit to stand mechanics and squat 5x  Wall 1/4 squat 15 ct hold 3 reps advised to do this for hep 3/week 5-7 reps    8/26/24: EDU patellar hypermobility and subluxation and knee cap less stable at terminal extension and more stable in flexion > 40 degrees needs to restore knee motion and strength and avoid sudden extension with valgus motion to manage and other options patella sleeve  QS 10x  SAQ 10x      OBJECTIVE:  STEADI Fall Risk: 0 (score of 4+ indicates fall risk)   OUTCOME TOOL: LEFS 22/80 8/26/24  Rom ext -12 actively and full passively  Flex 120 limited by apprehension  Patella mobility restraint good  No swelling   No deformity  Non antalgic gait  Quad set 25%  Quad 4-  Hams 4/5  Not able to SLR cannot hold knee in extension    9/17/24 full knee flexion rom noted, some discomfort still with extension and tendency to internally rotate/cave in RLE    ASSESSMENT: 8/26/24 Most likely right patellar subluxation episode and presents today guarded and concerned that this may happen again. Lacks knee flexion by 25 degrees due to discomfort and 5-10 degrees of active knee extension. Central patella. Min crepitation. Has passive restraint to lateral glide. No swelling. Some atrophy. Needs rehab for full recovery. Improved understanding that valgus motion with knee in last 30 degrees of extension is most risky position for subluxation and more aware of knee mechanics    9/17/24 better understanding of knee mechanics today      PLAN: PT 1/week for right patellar stability and knee program to restore extension and flexion rom quad strength knee control and function     Patient/parent/caregiver agreed and consented to plan of care for skilled physical therapy at 1 times per week for 6-12  weeks. Physical Therapy to include modalities prn as indicated, therapeutic exercise, manual therapy, neuromuscular re-education, gait and functional performance training, instruction and practice in a home exercise program (HEP) and self-management skills.     CARE PLAN/GOALS: (met, progressing, not progressing)    STG's: (      weeks /      visits )  1  2    LTG's  (      weeks /      visits )  1  2  3  4  5 The Global Rating of Change Score (GROC) will improve to 5/7 =  “a good deal better” or more.   6 Improve functional outcome tool score (FOTS: GIL, NDI, ASES, ABC, etc.) by > 10 points for Minimally Important Clinical Difference (MICD).  7 Patient/client will be independent with a HEP and self-management skills to further enhance recovery and progress.  8 Patient/client will verbalize >75% symptom reduction for frequency and severity of symptoms and/or > two point reduction of VAS/NPRS.  9 The Patient Specific Functional Scale metric (PSFS) will improve from baseline value to greater than 80% functional.

## 2024-09-24 ENCOUNTER — APPOINTMENT (OUTPATIENT)
Dept: PHYSICAL THERAPY | Facility: CLINIC | Age: 19
End: 2024-09-24
Payer: COMMERCIAL

## 2024-09-24 ENCOUNTER — DOCUMENTATION (OUTPATIENT)
Dept: PHYSICAL THERAPY | Facility: CLINIC | Age: 19
End: 2024-09-24
Payer: COMMERCIAL

## 2024-09-24 NOTE — PROGRESS NOTES
Physical Therapy                 Therapy Communication Note    Patient Name: Garrison Vallecillo  MRN: [id number]  Department:   Room: Room/bed info not found  Today's Date: 9/24/2024     Discipline: Physical Therapy    Missed Visit Reason:      Missed Time: Cancel    Comment:

## 2024-11-11 ENCOUNTER — DOCUMENTATION (OUTPATIENT)
Dept: PHYSICAL THERAPY | Facility: CLINIC | Age: 19
End: 2024-11-11
Payer: COMMERCIAL

## 2024-11-11 NOTE — PROGRESS NOTES
Discharge Summary    Name: Garrison Vallecillo  MRN: [id number]  : 2005  Date: 24    Discharge Summary: PT        Rehab Discharge Reason: Achieved all and/or the most significant goals(s)

## 2025-07-14 PROBLEM — E83.52 HYPERCALCEMIA: Status: ACTIVE | Noted: 2025-07-14

## 2025-07-14 PROBLEM — Z87.19 HISTORY OF RECTAL BLEEDING: Status: ACTIVE | Noted: 2025-07-14

## 2025-07-14 PROBLEM — Z76.89 ENCOUNTER TO ESTABLISH CARE: Status: ACTIVE | Noted: 2025-07-14

## 2025-07-15 ENCOUNTER — APPOINTMENT (OUTPATIENT)
Dept: PRIMARY CARE | Facility: CLINIC | Age: 20
End: 2025-07-15
Payer: COMMERCIAL

## 2025-07-29 ENCOUNTER — APPOINTMENT (OUTPATIENT)
Dept: PRIMARY CARE | Facility: CLINIC | Age: 20
End: 2025-07-29